# Patient Record
Sex: FEMALE | Race: WHITE | Employment: OTHER | URBAN - METROPOLITAN AREA
[De-identification: names, ages, dates, MRNs, and addresses within clinical notes are randomized per-mention and may not be internally consistent; named-entity substitution may affect disease eponyms.]

---

## 2022-06-01 ENCOUNTER — OFFICE VISIT (OUTPATIENT)
Dept: CARDIOLOGY CLINIC | Age: 72
End: 2022-06-01
Payer: COMMERCIAL

## 2022-06-01 VITALS
HEIGHT: 64 IN | WEIGHT: 152.8 LBS | HEART RATE: 59 BPM | SYSTOLIC BLOOD PRESSURE: 124 MMHG | BODY MASS INDEX: 26.09 KG/M2 | DIASTOLIC BLOOD PRESSURE: 74 MMHG

## 2022-06-01 DIAGNOSIS — E78.5 HYPERLIPIDEMIA, UNSPECIFIED HYPERLIPIDEMIA TYPE: ICD-10-CM

## 2022-06-01 DIAGNOSIS — I48.0 PAROXYSMAL ATRIAL FIBRILLATION (HCC): ICD-10-CM

## 2022-06-01 DIAGNOSIS — R00.2 PALPITATIONS: Primary | ICD-10-CM

## 2022-06-01 PROCEDURE — 99204 OFFICE O/P NEW MOD 45 MIN: CPT | Performed by: INTERNAL MEDICINE

## 2022-06-01 PROCEDURE — 93000 ELECTROCARDIOGRAM COMPLETE: CPT | Performed by: INTERNAL MEDICINE

## 2022-06-01 PROCEDURE — 1123F ACP DISCUSS/DSCN MKR DOCD: CPT | Performed by: INTERNAL MEDICINE

## 2022-06-01 RX ORDER — FLECAINIDE ACETATE 50 MG/1
50 TABLET ORAL 2 TIMES DAILY
COMMUNITY
End: 2022-08-09 | Stop reason: SDUPTHER

## 2022-06-01 RX ORDER — ASPIRIN 81 MG/1
81 TABLET ORAL DAILY
COMMUNITY

## 2022-06-01 ASSESSMENT — ENCOUNTER SYMPTOMS
DOUBLE VISION: 0
BLURRED VISION: 0
SHORTNESS OF BREATH: 0
NAUSEA: 0
HEMOPTYSIS: 0
COUGH: 0
ABDOMINAL PAIN: 0
VOMITING: 0
ORTHOPNEA: 0
BACK PAIN: 0
BLOATING: 0

## 2022-06-01 NOTE — PROGRESS NOTES
[facility] CARDIOLOGY  [address], [address]  PHONE: [phone]    22    NAME:  Chi Parr  : 1950  MRN: [id number]         SUBJECTIVE:   Chi Parr is a 66 y.o. female seen for a visit regarding the following:     Chief Complaint   Patient presents with    Irregular Heart Beat     hx of PAF; seeking 2nd opinion (states not happy w/ current cardiologist)       HPI:  (prior [address] [facility]; Dr Ping Vigil)    No prior history of coronary disease. Appears vagal syncope per history (; states low Bps at home with the episode and likely micturition syncope). Appears prior Holter from [facility] from 2021 with sinus rhythm with occasional PAC/PVCs. Appears subsequently set up for treadmill stress test at [facility] due to syncope with stated PAF during stress test (reported 1min 45 secs from 21). Serene was started on flecainide and subsequently saw EP at [facility] from 2021 and no changes at the time. Echo from 20 with preserved EF and normal LA size    Active at baseline with no issues. Can walk over a couple miles with no issues. Prior some dyspnea with palpitations. States palpitations noted in the morning and intermittent and can last for about 10 to 15 minutes. Reports having had heart rates up to 170 during her stress test.   Not interested in taking long-term prescription medications. Caffeine- 1 cup/day    Past Medical History, Past Surgical History, Family history, Social History, and Medications were all reviewed with the patient today and updated as necessary. Allergies   Allergen Reactions    Cephalexin Other (See Comments)     States happened 30 years ago - cannot remember     There is no problem list on file for this patient. Past Medical History:   Diagnosis Date    SVT (supraventricular tachycardia) ([facility] [address]      History reviewed. No pertinent surgical history.   Family History   Problem Relation Age of Onset    Heart Attack Neg Hx Social History     Tobacco Use    Smoking status: Never Smoker    Smokeless tobacco: Never Used   Substance Use Topics    Alcohol use: Yes     Comment: occ     Current Outpatient Medications   Medication Sig Dispense Refill    flecainide (TAMBOCOR) 50 MG tablet Take 50 mg by mouth 2 times daily      aspirin 81 MG EC tablet Take 81 mg by mouth daily       No current facility-administered medications for this visit. Review of Systems   Constitutional: Negative for chills, decreased appetite, fever, malaise/fatigue and weight gain. HENT: Negative for nosebleeds. Eyes: Negative for blurred vision and double vision. Cardiovascular: Positive for palpitations. Negative for chest pain, claudication, dyspnea on exertion, leg swelling, orthopnea, paroxysmal nocturnal dyspnea and syncope. Respiratory: Negative for cough, hemoptysis and shortness of breath. Endocrine: Negative for cold intolerance and heat intolerance. Hematologic/Lymphatic: Negative for bleeding problem. Skin: Negative for rash. Musculoskeletal: Negative for back pain, joint pain, muscle weakness and myalgias. Gastrointestinal: Negative for bloating, abdominal pain, nausea and vomiting. Genitourinary: Negative for dysuria. Neurological: Negative for dizziness, light-headedness and weakness. Psychiatric/Behavioral: Negative for altered mental status. PHYSICAL EXAM:    /74   Pulse 59 Comment: via EKG report  Ht 5' 4\" (1.626 m)   Wt 152 lb 12.8 oz (69.3 kg)   BMI 26.23 kg/m²      Physical Exam  Constitutional:       Appearance: Normal appearance. HENT:      Head: Normocephalic and atraumatic. Mouth/Throat:      Mouth: Mucous membranes are moist.   Eyes:      Pupils: Pupils are equal, round, and reactive to light. Neck:      Vascular: No carotid bruit. Cardiovascular:      Rate and Rhythm: Normal rate and regular rhythm. Pulses: Normal pulses.       Heart sounds: No murmur heard.      Pulmonary:      Effort: Pulmonary effort is normal.      Breath sounds: Normal breath sounds. Abdominal:      General: There is no distension. Palpations: Abdomen is soft. Tenderness: There is no abdominal tenderness. Musculoskeletal:         General: No swelling. Cervical back: Normal range of motion. Skin:     General: Skin is warm and dry. Neurological:      General: No focal deficit present. Mental Status: She is alert. Psychiatric:         Mood and Affect: Mood normal.         Medical problems and test results were reviewed with the patient today. No results found for any visits on 06/01/22. ASSESSMENT and PLAN    Bassam Wolfe was seen today for irregular heart beat. Diagnoses and all orders for this visit:    Palpitations    Paroxysmal atrial fibrillation ([address]  -     EKG 12 lead    Hyperlipidemia, unspecified hyperlipidemia type        Overall Impression    Paroxysmal atrial fibrillation  -Per records from [facility]. Stated for 1 minute and 45 seconds during stress test.  -Obtain EKG strips. Reviewed evaluation from [facility].  -Continue flecainide at this time. Discussed reassessing antiarrhythmics pending review of records. -[id number] score of 2. Discussed updated guidelines. Okay to continue aspirin.  -Echo from [facility] with preserved EF and normal left atrial size    SVT  -Per history. Reassess pending review of records    Palpitations  -Discussed home mobile monitors. Long-term, discussed consideration for loop recorder to help with diagnosis of atrial fibrillation/burden if noted. -BMP/TFT from 11/1/2021 okay    Hyperlipidemia  -Lipid profile reviewed from 11/1/2021 with noted LDL at 198; triglycerides at 184 non-HDL at 235. Apprehensive about prescription medications. Will address on follow-up    EP records from [facility] reviewed from 6/2021. Also cardiology records from [facility] reviewed from 5/2021 and prior appointments.   Holter's/echo from [facility] reviewed as well and discussed with patient    Return in about 3 months (around 9/1/2022).      Brendon Zaman MD  6/1/2022  4:48 PM

## 2022-08-09 ENCOUNTER — TELEPHONE (OUTPATIENT)
Dept: CARDIOLOGY CLINIC | Age: 72
End: 2022-08-09

## 2022-08-09 ENCOUNTER — NURSE ONLY (OUTPATIENT)
Dept: CARDIOLOGY CLINIC | Age: 72
End: 2022-08-09
Payer: COMMERCIAL

## 2022-08-09 VITALS — SYSTOLIC BLOOD PRESSURE: 132 MMHG | DIASTOLIC BLOOD PRESSURE: 80 MMHG | HEART RATE: 60 BPM

## 2022-08-09 DIAGNOSIS — I48.0 PAROXYSMAL ATRIAL FIBRILLATION (HCC): Primary | ICD-10-CM

## 2022-08-09 DIAGNOSIS — I48.0 PAROXYSMAL A-FIB (HCC): ICD-10-CM

## 2022-08-09 PROCEDURE — 93000 ELECTROCARDIOGRAM COMPLETE: CPT | Performed by: INTERNAL MEDICINE

## 2022-08-09 RX ORDER — FLECAINIDE ACETATE 50 MG/1
50 TABLET ORAL 2 TIMES DAILY
Qty: 180 TABLET | Refills: 0 | Status: CANCELLED | OUTPATIENT
Start: 2022-08-09

## 2022-08-09 RX ORDER — FLECAINIDE ACETATE 50 MG/1
50 TABLET ORAL 2 TIMES DAILY
Qty: 180 TABLET | Refills: 3 | Status: SHIPPED | OUTPATIENT
Start: 2022-08-09 | End: 2022-09-07 | Stop reason: SDUPTHER

## 2022-08-09 NOTE — TELEPHONE ENCOUNTER
MD Darrel Lewis, RN  Caller: Unspecified (Today, 10:40 AM)  She can come in for an EKG and if in sinus rhythm, plan a 2-week Zio patch. I do not have any earlier availability as I am leaving tomorrow on vacation andmy next day back is August 29th. Reviewed Dr. Saida Olivares' recommendation with pt. Verb understanding. Appt scheduled today in [facility] office at 3 pm for ekg, possible monitor. 0 = independent

## 2022-08-09 NOTE — PROGRESS NOTES
Pt in for EKG/nurse visit. EKG complete. 14 day [employer] Holter monitor placed due to pt exercises and works outside. Pt instructed on use and she voices understanding.

## 2022-08-09 NOTE — TELEPHONE ENCOUNTER
MEDICATION REFILL REQUEST      Name of Medication:  Flecainide  Dose:  50 MG  Frequency:  TBD  Quantity:  TBD  Days' supply:  TBD      Pharmacy Name/Location:  [facility] in [place] [address]

## 2022-08-09 NOTE — TELEPHONE ENCOUNTER
Flecainide 50mg BID verified in 6/1/22 office note. Medication pended & sent to Dr. Flaca Naavrrete for review & signature.  //pg

## 2022-08-09 NOTE — TELEPHONE ENCOUNTER
Patient called and said she wants to see Dr. Zulema Berg before September. Patient said she is not feeling well and is experiencing a high heart rate. Please call patient @ [phone]. Her prescription refill request has been sent to Dr. Zulema Berg.

## 2022-09-07 ENCOUNTER — OFFICE VISIT (OUTPATIENT)
Dept: CARDIOLOGY CLINIC | Age: 72
End: 2022-09-07
Payer: OTHER GOVERNMENT

## 2022-09-07 VITALS
HEIGHT: 64 IN | WEIGHT: 148 LBS | SYSTOLIC BLOOD PRESSURE: 128 MMHG | BODY MASS INDEX: 25.27 KG/M2 | HEART RATE: 64 BPM | DIASTOLIC BLOOD PRESSURE: 64 MMHG

## 2022-09-07 DIAGNOSIS — I48.0 PAROXYSMAL A-FIB (HCC): Primary | ICD-10-CM

## 2022-09-07 DIAGNOSIS — R00.2 PALPITATIONS: ICD-10-CM

## 2022-09-07 PROCEDURE — 99214 OFFICE O/P EST MOD 30 MIN: CPT | Performed by: INTERNAL MEDICINE

## 2022-09-07 PROCEDURE — 1123F ACP DISCUSS/DSCN MKR DOCD: CPT | Performed by: INTERNAL MEDICINE

## 2022-09-07 RX ORDER — FLECAINIDE ACETATE 50 MG/1
50 TABLET ORAL 2 TIMES DAILY
Qty: 180 TABLET | Refills: 3 | Status: SHIPPED | OUTPATIENT
Start: 2022-09-07 | End: 2022-11-02 | Stop reason: ALTCHOICE

## 2022-09-07 ASSESSMENT — ENCOUNTER SYMPTOMS
DOUBLE VISION: 0
VOMITING: 0
BLURRED VISION: 0
ABDOMINAL PAIN: 0
BLOATING: 0
NAUSEA: 0
HEMOPTYSIS: 0
SHORTNESS OF BREATH: 0
ORTHOPNEA: 0
BACK PAIN: 0
COUGH: 0

## 2022-09-07 NOTE — PROGRESS NOTES
[address], [address]  PHONE: [phone]    22    NAME:  Micheline Garcia  : 1950  MRN: [id number]         SUBJECTIVE:   Micheline Garcia is a 66 y.o. female seen for a visit regarding the following:     Chief Complaint   Patient presents with    Follow-up       HPI:  (prior [address] [facility]; Dr Adam Palacios)    No prior history of coronary disease. Appears vagal syncope per history (; states low Bps at home with the episode and likely micturition syncope). Appears prior Holter from [facility] from 2021 with sinus rhythm with occasional PAC/PVCs. Appears subsequently set up for treadmill stress test at [facility] due to syncope with stated PAF during stress test (reported 1min 45 secs from 21). Appears was started on flecainide and subsequently saw EP at [facility] from 2021 and no changes at the time. Echo from 20 with preserved EF and normal LA size. Reports recurrent palpitations but unremarkable home mobile monitoring. Subsequently with a 2-week extended Holter with sinus rhythm with an average heart rate of 67 [2-week monitor; rare ectopy no arrhythmias. No atrial fibrillation-2022]. States persistent palpitations and discussed above monitor unremarkable during wearing time. Also states resistant to long-term medications. Not interested in long-term flecainide. Prior--Active at baseline with no issues. Can walk over a couple miles with no issues. Prior some dyspnea with palpitations. States palpitations noted in the morning and intermittent and can last for about 10 to 15 minutes. Reports having had heart rates up to 170 during her stress test.   Not interested in taking long-term prescription medications.   Caffeine- 1 cup/day    Palpitations-not controlled, SVT-controlled, questionable PAF-stable    Past Medical History, Past Surgical History, Family history, Social History, and Medications were all reviewed with the patient today and updated as necessary. Allergies   Allergen Reactions    Cephalexin Other (See Comments)     States happened 30 years ago - cannot remember     There is no problem list on file for this patient. Past Medical History:   Diagnosis Date    SVT (supraventricular tachycardia) ([address]      No past surgical history on file. Family History   Problem Relation Age of Onset    Heart Attack Neg Hx      Social History     Tobacco Use    Smoking status: Never    Smokeless tobacco: Never   Substance Use Topics    Alcohol use: Yes     Comment: occ     Current Outpatient Medications   Medication Sig Dispense Refill    flecainide (TAMBOCOR) 50 MG tablet Take 1 tablet by mouth 2 times daily 180 tablet 3    aspirin 81 MG EC tablet Take 81 mg by mouth daily       No current facility-administered medications for this visit. Review of Systems   Constitutional: Negative for chills, decreased appetite, fever, malaise/fatigue and weight gain. HENT:  Negative for nosebleeds. Eyes:  Negative for blurred vision and double vision. Cardiovascular:  Positive for palpitations. Negative for chest pain, claudication, dyspnea on exertion, leg swelling, orthopnea, paroxysmal nocturnal dyspnea and syncope. Respiratory:  Negative for cough, hemoptysis and shortness of breath. Endocrine: Negative for cold intolerance and heat intolerance. Hematologic/Lymphatic: Negative for bleeding problem. Skin:  Negative for rash. Musculoskeletal:  Negative for back pain, joint pain, muscle weakness and myalgias. Gastrointestinal:  Negative for bloating, abdominal pain, nausea and vomiting. Genitourinary:  Negative for dysuria. Neurological:  Negative for dizziness, light-headedness and weakness. Psychiatric/Behavioral:  Negative for altered mental status.       PHYSICAL EXAM:    /64   Pulse 64   Ht 5' 4\" (1.626 m)   Wt 148 lb (67.1 kg)   BMI 25.40 kg/m²      Physical Exam  Constitutional:       Appearance: Normal appearance. HENT:      Head: Normocephalic and atraumatic. Mouth/Throat:      Mouth: Mucous membranes are moist.   Eyes:      Pupils: Pupils are equal, round, and reactive to light. Neck:      Vascular: No carotid bruit. Cardiovascular:      Rate and Rhythm: Normal rate and regular rhythm. Pulses: Normal pulses. Heart sounds: No murmur heard. Pulmonary:      Effort: Pulmonary effort is normal.      Breath sounds: Normal breath sounds. Abdominal:      General: There is no distension. Palpations: Abdomen is soft. Tenderness: There is no abdominal tenderness. Musculoskeletal:         General: No swelling. Cervical back: Normal range of motion. Skin:     General: Skin is warm and dry. Neurological:      General: No focal deficit present. Mental Status: She is alert. Psychiatric:         Mood and Affect: Mood normal.       Medical problems and test results were reviewed with the patient today. No results found for any visits on 09/07/22. ASSESSMENT and PLAN    Micaela Love was seen today for follow-up. Diagnoses and all orders for this visit:    Paroxysmal A-fib ([address]    Palpitations    Other orders  -     flecainide (TAMBOCOR) 50 MG tablet; Take 1 tablet by mouth 2 times daily        Overall Impression    Paroxysmal atrial fibrillation  -Per records from [facility]. Stated for 1 minute and 45 seconds during stress test.  -Requested EKG strips. Reviewed evaluation from [facility].  -Continue flecainide at this time. Discussed reassessing antiarrhythmics pending review of records. Resistant to medications. Discussed stopping flecainide and considering low-dose beta-blocker therapy. Wants to continue current for 3 months and assess home mobile monitoring  -[id number] score of 2. Discussed updated guidelines. Okay to continue aspirin.  -Echo from [facility] with preserved EF and normal left atrial size. SVT  -Per history.   Reassess pending review of records    Palpitations  -Discussed home mobile monitors. Long-term, discussed consideration for loop recorder to help with diagnosis of atrial fibrillation/burden if noted. -BMP/TFT from 11/1/2021 [facility]    Hyperlipidemia  -Lipid profile reviewed from 11/1/2021 with noted LDL at 198; triglycerides at 184 non-HDL at 235. Apprehensive about prescription medications. EP records from [facility] reviewed from 6/2021. Also cardiology records from [facility] reviewed from 5/2021 and prior appointments. Holter's/echo from [facility] reviewed as well and discussed with patient    Return in about 3 months (around 12/7/2022).      Ej George MD  9/7/2022  3:43 PM

## 2022-11-02 ENCOUNTER — OFFICE VISIT (OUTPATIENT)
Dept: CARDIOLOGY CLINIC | Age: 72
End: 2022-11-02
Payer: OTHER GOVERNMENT

## 2022-11-02 VITALS
DIASTOLIC BLOOD PRESSURE: 76 MMHG | BODY MASS INDEX: 23.78 KG/M2 | WEIGHT: 148 LBS | SYSTOLIC BLOOD PRESSURE: 124 MMHG | HEIGHT: 66 IN | HEART RATE: 72 BPM

## 2022-11-02 DIAGNOSIS — R00.2 PALPITATIONS: Primary | ICD-10-CM

## 2022-11-02 DIAGNOSIS — E78.5 HYPERLIPIDEMIA, UNSPECIFIED HYPERLIPIDEMIA TYPE: ICD-10-CM

## 2022-11-02 PROCEDURE — 1123F ACP DISCUSS/DSCN MKR DOCD: CPT | Performed by: INTERNAL MEDICINE

## 2022-11-02 PROCEDURE — 99214 OFFICE O/P EST MOD 30 MIN: CPT | Performed by: INTERNAL MEDICINE

## 2022-11-02 ASSESSMENT — ENCOUNTER SYMPTOMS
VOMITING: 0
BLURRED VISION: 0
ABDOMINAL PAIN: 0
SHORTNESS OF BREATH: 0
HEMOPTYSIS: 0
DOUBLE VISION: 0
BLOATING: 0
COUGH: 0
ORTHOPNEA: 0
BACK PAIN: 0
NAUSEA: 0

## 2022-11-02 NOTE — PROGRESS NOTES
[facility] CARDIOLOGY  [address] [facility], [address]  PHONE: [phone]    22    NAME:  Kesha Hebert  : 1950  MRN: [id number]         SUBJECTIVE:   Kesha Hebert is a 67 y.o. female seen for a visit regarding the following:     Chief Complaint   Patient presents with    Palpitations       HPI:  (prior [address]; Dr Carmela Gonzalez)    No prior history of coronary disease. Appears vagal syncope per history (; states low Bps at home with the episode and likely micturition syncope). Appears prior Holter from [facility] from 2021 with sinus rhythm with occasional PAC/PVCs. Appears subsequently set up for treadmill stress test at [facility] due to syncope with stated PAF during stress test (reported 1min 45 secs from 21). Appears was started on flecainide and subsequently saw EP at [facility] from 2021 and no changes at the time. Echo from 20 with preserved EF and normal LA size. Reports recurrent palpitations but unremarkable home mobile monitoring. Subsequently with a 2-week extended Holter with sinus rhythm with an average heart rate of 67 [2-week monitor; rare ectopy no arrhythmias. No atrial fibrillation-2022]. States persistent palpitations and discussed above monitor unremarkable during wearing time; also reviewed strips from home mobile monitor with sinus rhythm/sinus tachycardia. States she was concerned about some side effects of flecainide and cut down the pill to 25 mg twice daily. Also states resistant to long-term medications. Not interested in long-term flecainide. Prior--Active at baseline with no issues. Can walk over a couple miles with no issues. Prior some dyspnea with palpitations. States palpitations noted in the morning and intermittent and can last for about 10 to 15 minutes. Reports having had heart rates up to 170 during her stress test.   Not interested in taking long-term prescription medications.   Caffeine- 1 cup/day    Palpitations-not controlled, SVT-controlled, questionable PAF-stable    Past Medical History, Past Surgical History, Family history, Social History, and Medications were all reviewed with the patient today and updated as necessary. Allergies   Allergen Reactions    Cephalexin Other (See Comments)     States happened 30 years ago - cannot remember     There is no problem list on file for this patient. Past Medical History:   Diagnosis Date    SVT (supraventricular tachycardia) ([address]      No past surgical history on file. Family History   Problem Relation Age of Onset    Heart Attack Neg Hx      Social History     Tobacco Use    Smoking status: Never    Smokeless tobacco: Never   Substance Use Topics    Alcohol use: Yes     Comment: occ     Current Outpatient Medications   Medication Sig Dispense Refill    metoprolol tartrate (LOPRESSOR) 25 MG tablet Take 1 tablet by mouth as needed (for tachycardia) 60 tablet 3    aspirin 81 MG EC tablet Take 81 mg by mouth daily       No current facility-administered medications for this visit. Review of Systems   Constitutional: Negative for chills, decreased appetite, fever, malaise/fatigue and weight gain. HENT:  Negative for nosebleeds. Eyes:  Negative for blurred vision and double vision. Cardiovascular:  Positive for palpitations. Negative for chest pain, claudication, dyspnea on exertion, leg swelling, orthopnea, paroxysmal nocturnal dyspnea and syncope. Respiratory:  Negative for cough, hemoptysis and shortness of breath. Endocrine: Negative for cold intolerance and heat intolerance. Hematologic/Lymphatic: Negative for bleeding problem. Skin:  Negative for rash. Musculoskeletal:  Negative for back pain, joint pain, muscle weakness and myalgias. Gastrointestinal:  Negative for bloating, abdominal pain, nausea and vomiting. Genitourinary:  Negative for dysuria.    Neurological:  Negative for dizziness, light-headedness and weakness. Psychiatric/Behavioral:  Negative for altered mental status. PHYSICAL EXAM:    /76   Pulse 72   Ht 5' 6\" (1.676 m)   Wt 148 lb (67.1 kg)   BMI 23.89 kg/m²      Physical Exam  Constitutional:       Appearance: Normal appearance. HENT:      Head: Normocephalic and atraumatic. Mouth/Throat:      Mouth: Mucous membranes are moist.   Eyes:      Pupils: Pupils are equal, round, and reactive to light. Neck:      Vascular: No carotid bruit. Cardiovascular:      Rate and Rhythm: Normal rate and regular rhythm. Pulses: Normal pulses. Heart sounds: No murmur heard. Pulmonary:      Effort: Pulmonary effort is normal.      Breath sounds: Normal breath sounds. Abdominal:      General: There is no distension. Palpations: Abdomen is soft. Tenderness: There is no abdominal tenderness. Musculoskeletal:         General: No swelling. Cervical back: Normal range of motion. Skin:     General: Skin is warm and dry. Neurological:      General: No focal deficit present. Mental Status: She is alert. Psychiatric:         Mood and Affect: Mood normal.       Medical problems and test results were reviewed with the patient today. No results found for any visits on 11/02/22. ASSESSMENT and PLAN    Cherry Able was seen today for palpitations. Diagnoses and all orders for this visit:    Palpitations    Hyperlipidemia, unspecified hyperlipidemia type    Other orders  -     metoprolol tartrate (LOPRESSOR) 25 MG tablet; Take 1 tablet by mouth as needed (for tachycardia)        Overall Impression    Paroxysmal atrial fibrillation  -Per records from [facility]. Stated for 1 minute and 45 seconds during stress test.  -Reviewed evaluation from [facility]. -Apprehensive about flecainide and stopped medication. Appears currently taking 25 mg twice daily. Discussed low-dose beta-blocker therapy but wants to only use as needed. -[id number] score of 2.   Discussed updated guidelines. Okay to continue aspirin. Discussed consideration for ILR long-term to decide management once she turns 74.  -Echo from [facility] with preserved EF and normal left atrial size. SVT  -Per history. Reassess pending review of records    Palpitations  -Reviewed strips from home mobile monitors. Long-term, discussed consideration for loop recorder to help with diagnosis of atrial fibrillation/burden if noted. -BMP/TFT from 11/1/2021 okay    Hyperlipidemia  -Lipid profile reviewed from 11/1/2021 with noted LDL at 198; triglycerides at 184 non-HDL at 235. Apprehensive about prescription medications. EP records from [facility] reviewed from 6/2021. Also cardiology records from [facility] reviewed from 5/2021 and prior appointments. Holter's/echo from [facility] reviewed as well and discussed with patient. Return in about 1 year (around 11/2/2023).      Janeth Santoyo MD  11/2/2022  10:08 AM

## 2022-11-08 ENCOUNTER — TELEPHONE (OUTPATIENT)
Dept: CARDIOLOGY CLINIC | Age: 72
End: 2022-11-08

## 2022-11-08 NOTE — TELEPHONE ENCOUNTER
MEDICATION REFILL REQUEST      Name of Medication:  metoprolol   Dose:  25  Frequency:  ?  Quantity:  ?  Days' supply:  ?       Pharmacy Name/Location:  Please call [facility] they got  question on how many pills pt is to take

## 2022-11-10 ENCOUNTER — TELEPHONE (OUTPATIENT)
Dept: CARDIOLOGY CLINIC | Age: 72
End: 2022-11-10

## 2022-11-10 NOTE — TELEPHONE ENCOUNTER
Spoke to Nate at listed pharmacy and confirmed that corrected dosing instructions received and prescription is almost ready for . Placed call to pt made her aware also. Pt verbalized understanding.

## 2022-11-10 NOTE — TELEPHONE ENCOUNTER
Pharmacy is not filling her RX for Metoprolol because the instructions are different .  They told her they have called us 3 times     [facility] in [place] [facility] [address]

## 2024-05-29 ENCOUNTER — OFFICE VISIT (OUTPATIENT)
Age: 74
End: 2024-05-29
Payer: OTHER GOVERNMENT

## 2024-05-29 VITALS
WEIGHT: 152 LBS | DIASTOLIC BLOOD PRESSURE: 62 MMHG | HEIGHT: 66 IN | BODY MASS INDEX: 24.43 KG/M2 | SYSTOLIC BLOOD PRESSURE: 120 MMHG | HEART RATE: 61 BPM

## 2024-05-29 DIAGNOSIS — I47.10 SVT (SUPRAVENTRICULAR TACHYCARDIA) (HCC): ICD-10-CM

## 2024-05-29 DIAGNOSIS — R00.2 PALPITATIONS: Primary | ICD-10-CM

## 2024-05-29 DIAGNOSIS — I48.0 PAROXYSMAL A-FIB (HCC): ICD-10-CM

## 2024-05-29 PROCEDURE — G8428 CUR MEDS NOT DOCUMENT: HCPCS | Performed by: INTERNAL MEDICINE

## 2024-05-29 PROCEDURE — 99214 OFFICE O/P EST MOD 30 MIN: CPT | Performed by: INTERNAL MEDICINE

## 2024-05-29 PROCEDURE — 1036F TOBACCO NON-USER: CPT | Performed by: INTERNAL MEDICINE

## 2024-05-29 PROCEDURE — 93000 ELECTROCARDIOGRAM COMPLETE: CPT | Performed by: INTERNAL MEDICINE

## 2024-05-29 PROCEDURE — G8420 CALC BMI NORM PARAMETERS: HCPCS | Performed by: INTERNAL MEDICINE

## 2024-05-29 PROCEDURE — 1123F ACP DISCUSS/DSCN MKR DOCD: CPT | Performed by: INTERNAL MEDICINE

## 2024-05-29 PROCEDURE — G8400 PT W/DXA NO RESULTS DOC: HCPCS | Performed by: INTERNAL MEDICINE

## 2024-05-29 PROCEDURE — 1090F PRES/ABSN URINE INCON ASSESS: CPT | Performed by: INTERNAL MEDICINE

## 2024-05-29 PROCEDURE — 3017F COLORECTAL CA SCREEN DOC REV: CPT | Performed by: INTERNAL MEDICINE

## 2024-05-29 ASSESSMENT — ENCOUNTER SYMPTOMS
ABDOMINAL PAIN: 0
SHORTNESS OF BREATH: 0
DOUBLE VISION: 0
NAUSEA: 0
BLURRED VISION: 0
VOMITING: 0
ORTHOPNEA: 0
HEMOPTYSIS: 0
COUGH: 0
BLOATING: 0
BACK PAIN: 0

## 2024-05-29 NOTE — PROGRESS NOTES
to only use as needed.  -[id number] score of 2.  Discussed updated guidelines.  Discussed using aspirin but states apprehensive.  Discussed consideration for ILR long-term to decide management once she turns 74; however uncertain if it would  especially if not inclined for anticoagulation.  -Echo from [facility] with preserved EF and normal left atrial size.     SVT  -Per history.  Reassess pending review of records    Palpitations  -Reviewed strips from home mobile monitors.  Long-term, discussed consideration for loop recorder to help with diagnosis of atrial fibrillation/burden if noted.   -BMP/TFT from 11/1/2021 [facility]    Hyperlipidemia  -Lipid profile reviewed from 11/1/2021 with noted LDL at 198; triglycerides at 184 non-HDL at 235.  Apprehensive about prescription medications.     EP records from [facility] reviewed from 6/2021.  Also cardiology records from [facility] reviewed from 5/2021 and prior appointments.  Holter's/echo from [facility] reviewed as well and discussed with patient.    Return in about 1 year (around 5/29/2025).     Wisam Avila MD  5/29/2024  10:33 AM

## 2024-08-26 ENCOUNTER — OFFICE VISIT (OUTPATIENT)
Dept: ORTHOPEDIC SURGERY | Age: 74
End: 2024-08-26
Payer: COMMERCIAL

## 2024-08-26 DIAGNOSIS — M17.12 OSTEOARTHRITIS OF LEFT KNEE, UNSPECIFIED OSTEOARTHRITIS TYPE: ICD-10-CM

## 2024-08-26 DIAGNOSIS — S83.242A TEAR OF MEDIAL MENISCUS OF LEFT KNEE, CURRENT, UNSPECIFIED TEAR TYPE, INITIAL ENCOUNTER: ICD-10-CM

## 2024-08-26 DIAGNOSIS — S83.241A TEAR OF MEDIAL MENISCUS OF RIGHT KNEE, CURRENT, UNSPECIFIED TEAR TYPE, INITIAL ENCOUNTER: ICD-10-CM

## 2024-08-26 DIAGNOSIS — M17.11 OSTEOARTHRITIS OF RIGHT KNEE, UNSPECIFIED OSTEOARTHRITIS TYPE: ICD-10-CM

## 2024-08-26 DIAGNOSIS — M25.561 PAIN IN BOTH KNEES, UNSPECIFIED CHRONICITY: Primary | ICD-10-CM

## 2024-08-26 DIAGNOSIS — M25.562 PAIN IN BOTH KNEES, UNSPECIFIED CHRONICITY: Primary | ICD-10-CM

## 2024-08-26 PROCEDURE — 3017F COLORECTAL CA SCREEN DOC REV: CPT | Performed by: ORTHOPAEDIC SURGERY

## 2024-08-26 PROCEDURE — 1123F ACP DISCUSS/DSCN MKR DOCD: CPT | Performed by: PHYSICIAN ASSISTANT

## 2024-08-26 PROCEDURE — G8428 CUR MEDS NOT DOCUMENT: HCPCS | Performed by: PHYSICIAN ASSISTANT

## 2024-08-26 PROCEDURE — 1090F PRES/ABSN URINE INCON ASSESS: CPT | Performed by: PHYSICIAN ASSISTANT

## 2024-08-26 PROCEDURE — 4004F PT TOBACCO SCREEN RCVD TLK: CPT | Performed by: PHYSICIAN ASSISTANT

## 2024-08-26 PROCEDURE — G8420 CALC BMI NORM PARAMETERS: HCPCS | Performed by: PHYSICIAN ASSISTANT

## 2024-08-26 PROCEDURE — 99203 OFFICE O/P NEW LOW 30 MIN: CPT | Performed by: PHYSICIAN ASSISTANT

## 2024-08-26 PROCEDURE — G8400 PT W/DXA NO RESULTS DOC: HCPCS | Performed by: PHYSICIAN ASSISTANT

## 2024-08-26 NOTE — PROGRESS NOTES
daily as needed (for tachycardia), Disp: 60 tablet, Rfl: 3  Allergies   Allergen Reactions    Cephalexin Other (See Comments)     States happened 30 years ago - cannot remember     Past Medical History:   Diagnosis Date    SVT (supraventricular tachycardia) (HCC)      .pmh  No past surgical history on file.  Family History   Problem Relation Age of Onset    Heart Attack Neg Hx      Social History     Socioeconomic History    Marital status: Unknown     Spouse name: Not on file    Number of children: Not on file    Years of education: Not on file    Highest education level: Not on file   Occupational History    Not on file   Tobacco Use    Smoking status: Never    Smokeless tobacco: Never   Substance and Sexual Activity    Alcohol use: Yes     Comment: occ    Drug use: Not Currently    Sexual activity: Not on file   Other Topics Concern    Not on file   Social History Narrative    Not on file     Social Determinants of Health     Financial Resource Strain: Not on file   Food Insecurity: Not on file   Transportation Needs: Not on file   Physical Activity: Not on file   Stress: Not on file   Social Connections: Unknown (3/20/2021)    Received from [facility]    Social Connections     Frequency of Communication with Friends and Family: Not asked     Frequency of Social Gatherings with Friends and Family: Not asked   Intimate Partner Violence: Unknown (3/20/2021)    Received from [facility]    Intimate Partner Violence     Fear of Current or Ex-Partner: Not asked     Emotionally Abused: Not asked     Physically Abused: Not asked     Sexually Abused: Not asked   Housing Stability: Not At Risk (3/10/2022)    Received from [facility]    Housing Stability     Was there a time when you did not have a steady place to sleep: Not asked     Worried that the place you are staying is making you sick: Not asked       Review of Systems:  As per HPI.  Pertinent positives and  knee, current, unspecified tear type, initial encounter            RECOMMENDATIONS:    Reviewed x-ray and MRI findings with the patient.  As noted, she reports that her knee pain has improved and is currently very minimal in severity.  She denies any mechanical symptoms.  At this time she does not feel functionally limited enough to warrant any further treatment.  We discussed performing injection therapy including IA cortisone if her symptoms become more limiting.  For now she will continue with activity as tolerated.  She was encouraged to continue with low impact exercises which were reviewed today.  She she will follow-up with us on as-needed basis with any worsening of symptoms.    Approximately 40 minutes was spent reviewing the medical record, imaging, performing history and physical examination, discussing the diagnosis and treatment plan with the patient, and completing documentation for this visit.

## 2024-08-26 NOTE — PROGRESS NOTES
Name: Rocio Elise  YOB: 1950  Gender: female  MRN: [id number]    CC:   Chief Complaint   Patient presents with    Joint Pain     Bilateral knee pain will xray today          DIAGNOSIS:   Encounter Diagnoses   Name Primary?    Pain in both knees, unspecified chronicity Yes    Osteoarthritis of right knee, unspecified osteoarthritis type     Osteoarthritis of left knee, unspecified osteoarthritis type         HPI:   The pain has been present for months and is becoming worse.  It hurts at night when sleeping.  The pain is located over the knee.  It does hurt to walk and gets worse with increased distances.   The pain does not radiate down the leg.  Numbness and tingling are not noted.   Treatment so far has been ***      Current Outpatient Medications:     TURMERIC PO, Take by mouth, Disp: , Rfl:     metoprolol tartrate (LOPRESSOR) 25 MG tablet, Take 1 tablet by mouth 2 times daily as needed (for tachycardia), Disp: 60 tablet, Rfl: 3  Allergies   Allergen Reactions    Cephalexin Other (See Comments)     States happened 30 years ago - cannot remember     Past Medical History:   Diagnosis Date    SVT (supraventricular tachycardia) ([facility])      .pmh  No past surgical history on file.  Family History   Problem Relation Age of Onset    Heart Attack Neg Hx      Social History     Socioeconomic History    Marital status: Unknown     Spouse name: Not on file    Number of children: Not on file    Years of education: Not on file    Highest education level: Not on file   Occupational History    Not on file   Tobacco Use    Smoking status: Never    Smokeless tobacco: Never   Substance and Sexual Activity    Alcohol use: Yes     Comment: occ    Drug use: Not Currently    Sexual activity: Not on file   Other Topics Concern    Not on file   Social History Narrative    Not on file     Social Determinants of Health     Financial Resource Strain: Not on file   Food Insecurity: Not on file   Transportation Needs: Not on

## 2025-06-04 ENCOUNTER — OFFICE VISIT (OUTPATIENT)
Age: 75
End: 2025-06-04
Payer: COMMERCIAL

## 2025-06-04 VITALS
HEIGHT: 66 IN | BODY MASS INDEX: 24.53 KG/M2 | SYSTOLIC BLOOD PRESSURE: 140 MMHG | HEART RATE: 66 BPM | DIASTOLIC BLOOD PRESSURE: 78 MMHG

## 2025-06-04 DIAGNOSIS — I47.10 SVT (SUPRAVENTRICULAR TACHYCARDIA): ICD-10-CM

## 2025-06-04 DIAGNOSIS — E78.2 HYPERLIPIDEMIA, MIXED: ICD-10-CM

## 2025-06-04 DIAGNOSIS — I48.0 PAROXYSMAL A-FIB (HCC): ICD-10-CM

## 2025-06-04 DIAGNOSIS — R00.2 PALPITATIONS: Primary | ICD-10-CM

## 2025-06-04 PROCEDURE — 1123F ACP DISCUSS/DSCN MKR DOCD: CPT | Performed by: INTERNAL MEDICINE

## 2025-06-04 PROCEDURE — 93000 ELECTROCARDIOGRAM COMPLETE: CPT | Performed by: INTERNAL MEDICINE

## 2025-06-04 PROCEDURE — 99214 OFFICE O/P EST MOD 30 MIN: CPT | Performed by: INTERNAL MEDICINE

## 2025-06-04 ASSESSMENT — ENCOUNTER SYMPTOMS
SHORTNESS OF BREATH: 0
VOMITING: 0
DOUBLE VISION: 0
BACK PAIN: 0
ABDOMINAL PAIN: 0
BLOATING: 0
HEMOPTYSIS: 0
NAUSEA: 0
ORTHOPNEA: 0
COUGH: 0
BLURRED VISION: 0

## 2025-06-04 NOTE — PROGRESS NOTES
(supraventricular tachycardia)  -     EKG 12 Lead    Hyperlipidemia, mixed  -     Lipid Panel; Future          Overall Impression    Paroxysmal atrial fibrillation  -Per records from [facility].  Stated for 1 minute and 45 seconds during stress test.  -Reviewed evaluation from [facility] EP.  -Apprehensive about flecainide and stopped medication.  Discussed low-dose beta-blocker therapy but wants to only use as needed.  -[id number] score of 3 (if diagnosed with hypertension, score of 4).  Discussed updated guidelines.  Discussed at least using aspirin but states apprehensive.  Discussed consideration for ILR long-term to decide management; however uncertain if it would  especially if not inclined for anticoagulation.  -Echo from [facility] with preserved EF and normal left atrial size.     SVT  -Per history.  Stable.  Continue home mobile monitoring.    Palpitations  -Reviewed previous strips from home mobile monitors.  Long-term, discussed consideration for loop recorder to help with diagnosis of atrial fibrillation/burden if noted.   -BMP/TFT from 11/1/2021 okay; no updated labs and repeat labs.    Hyperlipidemia  -Lipid profile reviewed from 11/1/2021 with noted LDL at 198; triglycerides at 184 non-HDL at 235.  Apprehensive about prescription medications.   - Repeat lipid profile.    EP records from [facility] reviewed from 6/2021.  Also cardiology records from [facility] reviewed from 5/2021 and prior appointments.  Holter's/echo from [facility] reviewed as well and discussed with patient.    Return in about 1 year (around 6/4/2026).     Wisam Avila MD  6/4/2025  4:12 PM